# Patient Record
Sex: MALE | Employment: FULL TIME | URBAN - METROPOLITAN AREA
[De-identification: names, ages, dates, MRNs, and addresses within clinical notes are randomized per-mention and may not be internally consistent; named-entity substitution may affect disease eponyms.]

---

## 2024-10-01 ENCOUNTER — APPOINTMENT (OUTPATIENT)
Dept: GENERAL RADIOLOGY | Age: 24
End: 2024-10-01

## 2024-10-01 ENCOUNTER — HOSPITAL ENCOUNTER (EMERGENCY)
Age: 24
Discharge: HOME | End: 2024-10-02
Attending: EMERGENCY MEDICINE

## 2024-10-01 DIAGNOSIS — J45.901 EXACERBATION OF ASTHMA, UNSPECIFIED ASTHMA SEVERITY, UNSPECIFIED WHETHER PERSISTENT (HCC): Primary | ICD-10-CM

## 2024-10-01 PROCEDURE — 71046 X-RAY EXAM CHEST 2 VIEWS: CPT | Performed by: EMERGENCY MEDICINE

## 2024-10-01 PROCEDURE — 99284 EMERGENCY DEPT VISIT MOD MDM: CPT

## 2024-10-01 PROCEDURE — 93005 ELECTROCARDIOGRAM TRACING: CPT

## 2024-10-01 PROCEDURE — 94644 CONT INHLJ TX 1ST HOUR: CPT

## 2024-10-01 PROCEDURE — 93010 ELECTROCARDIOGRAM REPORT: CPT

## 2024-10-01 RX ORDER — PREDNISONE 20 MG/1
60 TABLET ORAL ONCE
Status: COMPLETED | OUTPATIENT
Start: 2024-10-01 | End: 2024-10-01

## 2024-10-01 RX ORDER — ALBUTEROL SULFATE 90 UG/1
2 INHALANT RESPIRATORY (INHALATION) EVERY 6 HOURS PRN
COMMUNITY

## 2024-10-01 RX ORDER — ALBUTEROL SULFATE 5 MG/ML
10 SOLUTION RESPIRATORY (INHALATION) ONCE
Status: COMPLETED | OUTPATIENT
Start: 2024-10-01 | End: 2024-10-01

## 2024-10-01 RX ORDER — ALBUTEROL SULFATE 5 MG/ML
SOLUTION RESPIRATORY (INHALATION)
Status: COMPLETED
Start: 2024-10-01 | End: 2024-10-01

## 2024-10-02 VITALS
HEIGHT: 68 IN | RESPIRATION RATE: 16 BRPM | OXYGEN SATURATION: 99 % | DIASTOLIC BLOOD PRESSURE: 76 MMHG | HEART RATE: 86 BPM | BODY MASS INDEX: 27.89 KG/M2 | TEMPERATURE: 98 F | SYSTOLIC BLOOD PRESSURE: 123 MMHG | WEIGHT: 184 LBS

## 2024-10-02 RX ORDER — FLUTICASONE PROPIONATE 44 UG/1
2 AEROSOL, METERED RESPIRATORY (INHALATION) 2 TIMES DAILY
Qty: 10.6 G | Refills: 0 | Status: SHIPPED | OUTPATIENT
Start: 2024-10-02

## 2024-10-02 RX ORDER — ALBUTEROL SULFATE 0.83 MG/ML
2.5 SOLUTION RESPIRATORY (INHALATION) EVERY 4 HOURS PRN
Qty: 30 EACH | Refills: 0 | Status: SHIPPED | OUTPATIENT
Start: 2024-10-02 | End: 2024-11-01

## 2024-10-02 RX ORDER — PREDNISONE 20 MG/1
60 TABLET ORAL DAILY
Qty: 12 TABLET | Refills: 0 | Status: SHIPPED | OUTPATIENT
Start: 2024-10-02 | End: 2024-10-06

## 2024-10-02 NOTE — ED PROVIDER NOTES
Patient Seen in: [facility] In [place]      History     Chief Complaint   Patient presents with    Difficulty Breathing     Stated Complaint: asthma exacerbation    Subjective:   HPI    Patient is a 24-year-old male presenting to the ED with shortness of breath, chest tightness, and wheezing with known history of asthma.  Patient feels like he has had shortness of breath and wheezing for the past week but it became worse today.  Has been trying to use his albuterol inhaler without any significant relief in his symptoms.  The patient states he is  and is allergic to dust.  He states he used his inhaler 6 times today, last at 8:30 PM without any relief in his symptoms.  The patient was prescribed a steroid last Tuesday.  Patient states he only took it for 2 days.  No associated fevers or chills.  Patient states that he has been seen by his primary care provider and they requested to be referred to a specialist however they do not feel it is necessary at this time.  He does not use an inhaled steroid inhaler.  No congestion.  He does have an occasional cough.  Patient states he has been dealing with asthma his whole life.  Has been using his inhaler frequently.      Objective:     Past Medical History:    Asthma (HCC)              History reviewed. No pertinent surgical history.             Social History     Socioeconomic History    Marital status:    Tobacco Use    Smoking status: Former     Types: Cigarettes    Smokeless tobacco: Never   Vaping Use    Vaping status: Former   Substance and Sexual Activity    Alcohol use: Not Currently     Comment: occ    Drug use: Never              Physical Exam     ED Triage Vitals [10/01/24 2142]   /90   Pulse 96   Resp 20   Temp 98.3 °F (36.8 °C)   Temp src Oral   SpO2 99 %   O2 Device None (Room air)       Current Vitals:   Vital Signs  BP: 123/76  Pulse: 86  Resp: 16  Temp: 98.3 °F (36.8 °C)  Temp src: Oral    Oxygen Therapy  SpO2:  99 %  O2 Device: None (Room air)        Physical Exam  Vitals and nursing note reviewed.   Constitutional:       General: He is not in acute distress.     Appearance: Normal appearance. He is not ill-appearing.   HENT:      Head: Normocephalic and atraumatic.      Right Ear: External ear normal.      Left Ear: External ear normal.      Nose: Nose normal.      Mouth/Throat:      Mouth: Mucous membranes are moist.      Pharynx: Oropharynx is clear. No posterior oropharyngeal erythema.   Eyes:      Conjunctiva/sclera: Conjunctivae normal.   Cardiovascular:      Rate and Rhythm: Normal rate and regular rhythm.   Pulmonary:      Effort: Pulmonary effort is normal. No respiratory distress.      Breath sounds: Wheezing present.      Comments: Inspiratory expectorate wheeze, minimal  Abdominal:      General: Abdomen is flat. Bowel sounds are normal. There is no distension.      Tenderness: There is no abdominal tenderness.   Musculoskeletal:      Right lower leg: No edema.      Left lower leg: No edema.   Skin:     General: Skin is warm.      Capillary Refill: Capillary refill takes less than 2 seconds.      Findings: No rash.   Neurological:      Mental Status: He is alert and oriented to person, place, and time.   Psychiatric:         Mood and Affect: Mood normal.         Behavior: Behavior normal.             ED Course   Labs Reviewed - No data to display  EKG    Rate, intervals and axes as noted on EKG Report.  Rate: 101  Rhythm: Sinus Rhythm  Reading: Sinus tachycardia, otherwise normal EKG                     MDM        History obtained from patient.     Differential diagnosis includes acute exacerbation of asthma possible seasonal or allergic triggers, pneumothorax, viral illness less likely as this appears to be a chronic problem.  No fevers or chills to suggest pneumonia.  Chronic cough.    Previous records reviewed.  Patient was last seen on September 25, 2024 for mild intermittent asthma without complication as  well as seasonal allergic rhinitis, unspecific trigger.  Patient was experiencing viral URI symptoms about 2 weeks ago but those have improved and resolved.  It appears that he was on Flovent but did not continue and was asking for refill.  He has been on maintenance inhalers in the past but was not well-controlled.  He was referred to an allergist for asthma.  He needs an updated referral.  This is different from the history that the patient and family are presenting with stating that they have requested referral to a specialist yet have not been provided 1.  They also report that the patient has never been on maintenance inhalers such as a daily steroid inhaler only using albuterol as needed.  It is possible that there is a lack of understanding regarding the patient's treatment plan.  The patient was also prescribed prednisone for 5 days not 2 days of last week.  That appears that they discontinued this on their own.    Testing considered and ordered includes chest x-ray.      I personally reviewed the radiographs and my individual interpretation shows no infiltrate no consolidation and no pneumothorax  I also reviewed the official report which shows      XR CHEST PA + LAT CHEST (CPT=71046)    Result Date: 10/1/2024  PROCEDURE:  XR CHEST PA + LAT CHEST (CPT=71046)  INDICATIONS:  asthma exacerbation, taken 6 inhaler treatments throughout day with no relief, o2 100 in triage  COMPARISON:  None.  TECHNIQUE:  PA and lateral chest radiographs were obtained.  PATIENT STATED HISTORY: (As transcribed by Technologist)  Patient has ashthma exacerbation. Patient taken six inhalers treatments today with no relief. Patient has left sided chest pains. Patient has shortness of breath and a dry cough.    FINDINGS:  Normal heart size and pulmonary vascularity.  Clear lungs.  No pleural effusion.            CONCLUSION:  No abnormality demonstrated in the chest.   LOCATION:  [facility]   Dictated by (CST): Mustapha Dyer MD on  10/01/2024 at 10:12 PM     Finalized by (CST): Mustapha Dyer MD on 10/01/2024 at 10:12 PM        Patient was reassessed after hour-long neb treatment and oral prednisone.  Overall significant improvement in symptoms.  O2 sat is 100% on room air.  No complaints of shortness of breath.  Patient does not have a neb machine at home.  Instructed on where he can purchase a neb machine and will prescribe albuterol nebs to be used as home every 4 hours.  Unable to provide 1 in the ED at this time.  He has never been on inhaled steroid.  In order to reduce frequency of use of albuterol rescue inhaler discussed at length the importance of using daily maintenance inhaled steroid, Flovent, low-dose to see if patient can minimize use of albuterol.  Has been using his albuterol inhaler without a spacer.  This will also be provided to patient for more efficient use.  Will also prescribe prednisone for total of 5 days.  Discussed the importance of taking oral prednisone for a total of 5 days instead of stopping after 2.  Reassurance regarding initial providers treatment plan was also given to patient.  It also appears that there was a referral to a specialist, an allergist.  Continue antihistamine as instructed.    Plan was discussed with patient.  Return to ED if any symptoms worsen, persist, or new symptoms develop.  Otherwise outpatient follow-up with your primary care provider.  Patient states he does have a primary care provider for outpatient follow-up        Medical Decision Making      Disposition and Plan     Clinical Impression:  1. Exacerbation of asthma, unspecified asthma severity, unspecified whether persistent (HCC)         Disposition:  Discharge  10/2/2024 12:22 am    Follow-up:  Your primary care provider    Schedule an appointment as soon as possible for a visit in 2 day(s)      [facility] in [place]  [id number] [place] [id number]  [phone]  Follow up  IF SYMPTOMS WORSEN,  PERSIST, OR NEW SYMPTOMS DEVEL          Medications Prescribed:  Discharge Medication List as of 10/2/2024 12:37 AM        START taking these medications    Details   predniSONE 20 MG Oral Tab Take 3 tablets (60 mg total) by mouth daily for 4 days., Normal, Disp-12 tablet, R-0      fluticasone propionate 44 MCG/ACT Inhalation Aerosol Inhale 2 puffs into the lungs 2 (two) times daily., Normal, Disp-10.6 g, R-0      albuterol (2.5 MG/3ML) 0.083% Inhalation Nebu Soln Take 3 mL (2.5 mg total) by nebulization every 4 (four) hours as needed for Wheezing or Shortness of Breath., Normal, Disp-30 each, R-0                 Supplementary Documentation:

## 2024-10-02 NOTE — ED INITIAL ASSESSMENT (HPI)
Patient to ER with c/o SOB and chest pain when attempting to take a deep breath, today is the worse day but having trouble with breathing for the past week. Patient states, \"I don't know if it's because its not letting me take a breath that my heart started to hurt today\". Patient with a history of asthma. Six inhaler treatments today, last at 2033 with no relief. Patient was prescribed a steroid on Tuesday, one dose taken out of five days, patient stopped taking it stating, \"it was making it worse, not helping\".

## 2024-10-03 LAB
ATRIAL RATE: 101 BPM
P AXIS: 58 DEGREES
P-R INTERVAL: 130 MS
Q-T INTERVAL: 338 MS
QRS DURATION: 92 MS
QTC CALCULATION (BEZET): 438 MS
R AXIS: -14 DEGREES
T AXIS: 61 DEGREES
VENTRICULAR RATE: 101 BPM